# Patient Record
Sex: MALE
[De-identification: names, ages, dates, MRNs, and addresses within clinical notes are randomized per-mention and may not be internally consistent; named-entity substitution may affect disease eponyms.]

---

## 2018-01-19 ENCOUNTER — HOSPITAL ENCOUNTER (OUTPATIENT)
Dept: HOSPITAL 14 - H.ER | Age: 58
Setting detail: OBSERVATION
Discharge: LEFT BEFORE DISCHARGE | End: 2018-01-19
Attending: INTERNAL MEDICINE | Admitting: INTERNAL MEDICINE
Payer: COMMERCIAL

## 2018-01-19 VITALS — RESPIRATION RATE: 20 BRPM | OXYGEN SATURATION: 98 %

## 2018-01-19 VITALS — HEART RATE: 67 BPM | DIASTOLIC BLOOD PRESSURE: 66 MMHG | SYSTOLIC BLOOD PRESSURE: 123 MMHG

## 2018-01-19 VITALS — TEMPERATURE: 98.1 F

## 2018-01-19 DIAGNOSIS — R07.9: Primary | ICD-10-CM

## 2018-01-19 DIAGNOSIS — F17.200: ICD-10-CM

## 2018-01-19 DIAGNOSIS — R42: ICD-10-CM

## 2018-01-19 LAB
ALBUMIN SERPL-MCNC: 3.8 G/DL (ref 3.5–5)
ALBUMIN/GLOB SERPL: 1.3 {RATIO} (ref 1–2.1)
ALT SERPL-CCNC: 40 U/L (ref 21–72)
APTT BLD: 32.9 SECONDS (ref 25.6–37.1)
AST SERPL-CCNC: 24 U/L (ref 17–59)
BASOPHILS # BLD AUTO: 0 K/UL (ref 0–0.2)
BASOPHILS NFR BLD: 0.2 % (ref 0–2)
BNP SERPL-MCNC: 50.7 PG/ML (ref 0–900)
BUN SERPL-MCNC: 18 MG/DL (ref 9–20)
CALCIUM SERPL-MCNC: 9.2 MG/DL (ref 8.4–10.2)
EOSINOPHIL # BLD AUTO: 0.2 K/UL (ref 0–0.7)
EOSINOPHIL NFR BLD: 2.2 % (ref 0–4)
ERYTHROCYTE [DISTWIDTH] IN BLOOD BY AUTOMATED COUNT: 13.4 % (ref 11.5–14.5)
GFR NON-AFRICAN AMERICAN: > 60
HGB BLD-MCNC: 13.7 G/DL (ref 12–18)
INR PPP: 1 (ref 0.9–1.2)
LYMPHOCYTES # BLD AUTO: 2.5 K/UL (ref 1–4.3)
LYMPHOCYTES NFR BLD AUTO: 35.6 % (ref 20–40)
MCH RBC QN AUTO: 30 PG (ref 27–31)
MCHC RBC AUTO-ENTMCNC: 33.1 G/DL (ref 33–37)
MCV RBC AUTO: 90.7 FL (ref 80–94)
MONOCYTES # BLD: 0.6 K/UL (ref 0–0.8)
MONOCYTES NFR BLD: 8.7 % (ref 0–10)
NEUTROPHILS # BLD: 3.7 K/UL (ref 1.8–7)
NEUTROPHILS NFR BLD AUTO: 53.3 % (ref 50–75)
NRBC BLD AUTO-RTO: 0 % (ref 0–0)
PLATELET # BLD: 174 K/UL (ref 130–400)
PMV BLD AUTO: 8.6 FL (ref 7.2–11.7)
PROTHROMBIN TIME: 11.4 SECONDS (ref 9.8–13.1)
RBC # BLD AUTO: 4.57 MIL/UL (ref 4.4–5.9)
WBC # BLD AUTO: 7 K/UL (ref 4.8–10.8)

## 2018-01-19 PROCEDURE — 84484 ASSAY OF TROPONIN QUANT: CPT

## 2018-01-19 PROCEDURE — 99283 EMERGENCY DEPT VISIT LOW MDM: CPT

## 2018-01-19 PROCEDURE — 85610 PROTHROMBIN TIME: CPT

## 2018-01-19 PROCEDURE — 80053 COMPREHEN METABOLIC PANEL: CPT

## 2018-01-19 PROCEDURE — 85025 COMPLETE CBC W/AUTO DIFF WBC: CPT

## 2018-01-19 PROCEDURE — 83880 ASSAY OF NATRIURETIC PEPTIDE: CPT

## 2018-01-19 PROCEDURE — 71045 X-RAY EXAM CHEST 1 VIEW: CPT

## 2018-01-19 PROCEDURE — 85730 THROMBOPLASTIN TIME PARTIAL: CPT

## 2018-01-19 PROCEDURE — 93005 ELECTROCARDIOGRAM TRACING: CPT

## 2018-01-19 NOTE — RAD
PROCEDURE:  CHEST RADIOGRAPH, 1 VIEW



HISTORY:

chest pain



COMPARISON:

None available.



FINDINGS:



LUNGS:

Clear.



PLEURA:

No pneumothorax or pleural fluid seen.



CARDIOVASCULAR:

Atherosclerotic aortic calcifications.  Cardiomediastinal silhouette 

within the limits.



OSSEOUS STRUCTURES:

Degenerative changes.



VISUALIZED UPPER ABDOMEN:

Normal.



OTHER FINDINGS:

None. 



IMPRESSION:

No active disease.

## 2018-01-19 NOTE — ED PDOC
HPI: Chest Pain


Time Seen by Provider: 01/19/18 13:57


Chief Complaint (Nursing): Chest Pain


Chief Complaint (Provider): Chest Pain


History Per: Patient


History/Exam Limitations: no limitations


Onset/Duration Of Symptoms: Mins (prior to arrival)


Current Symptoms Are (Timing): Still Present


Additional Complaint(s): 


57 year old male presents to the ED complaining of chest pain and associated 

dizziness, onset minutes prior to arrival. Questionable history of HTN 

mentioned by patient. He left work after feeling chest pain and dizziness while 

eating lunch. Patient reports intermittent left sided chest discomfort over the 

last several days. The dizziness was a new symptom. He states having a cardiac 

workup done years ago but failed to follow up with the physician. Admits to 

taking Aspirin intermittently. Denies dyspnea, syncope, cough and fever. 





PMD: none provided








Past Medical History


Reviewed: Historical Data, Nursing Documentation, Vital Signs


Vital Signs: 


 Last Vital Signs











Temp  98.1 F   01/19/18 13:48


 


Pulse  67   01/19/18 15:23


 


Resp  20   01/19/18 15:23


 


BP  123/66   01/19/18 15:23


 


Pulse Ox  98   01/19/18 16:59














- Medical History


PMH: HTN (possible)





- Surgical History


Surgical History: No Surg Hx





- Family History


Family History: States: Unknown Family Hx





- Social History


Current smoker - smoking cessation education provided: Yes





- Home Medications


Home Medications: 


 Ambulatory Orders











 Medication  Instructions  Recorded


 


Aspirin [Adult Low Dose Aspirin EC] 81 mg PO DAILY #10 tablet. 01/19/18


 


Aspirin [Ecotrin] 81 mg PO DAILY 01/19/18














- Allergies


Allergies/Adverse Reactions: 


 Allergies











Allergy/AdvReac Type Severity Reaction Status Date / Time


 


No Known Allergies Allergy   Verified 01/19/18 13:48














Review of Systems


ROS Statement: Except As Marked, All Systems Reviewed And Found Negative


Cardiovascular: Positive for: Chest Pain


Neurological: Positive for: Dizziness





Physical Exam





- Reviewed


Nursing Documentation Reviewed: Yes


Vital Signs Reviewed: Yes





- Physical Exam


Appears: Positive for: Non-toxic, No Acute Distress


Head Exam: Positive for: ATRAUMATIC, NORMOCEPHALIC


Skin: Positive for: Normal Color, Warm, Dry


Eye Exam: Positive for: EOMI, Normal appearance, PERRL


Neck: Positive for: Normal, Painless ROM, Supple


Cardiovascular/Chest: Positive for: Regular Rate, Rhythm.  Negative for: Murmur


Respiratory: Positive for: Normal Breath Sounds.  Negative for: Respiratory 

Distress


Gastrointestinal/Abdominal: Positive for: Normal Exam, Soft


Back: Positive for: Normal Inspection.  Negative for: L CVA Tenderness, R CVA 

Tenderness, Vertebral Tenderness


Extremity: Positive for: Normal ROM.  Negative for: Deformity


Neurologic/Psych: Positive for: Alert, Oriented.  Negative for: Motor/Sensory 

Deficits





- Laboratory Results


Result Diagrams: 


 01/19/18 14:07





 01/19/18 14:07





- ECG


ECG: Positive for: Interpreted By Me, Viewed By Me


O2 Sat by Pulse Oximetry: 98





Medical Decision Making


Medical Decision Making: 


Time: 13:55


Plan: 


--EKG 


--BNP 


--CMP 


--Troponin I 


--CBC 


--PTT


--Chest X-ray 


--Aspirin 325 mg PO 


--Nitroglycerin .4 mg SL 





EKG: 


#1 at 13:46: shows ST Elevation anteriorly. No reciprocal changes noted. Rate 

of 60. 


#2 at 13:55: Reveals nonspecific ST changes with incomplete RBB block. Rate is 

also at 60 


Both EKGs were transmitted to Dr. Rizo on call code heart cardiologist at 13:

55. Not a Code heart candidate, but there is concern for Brugada syndrome. 

Patient requires workup. 


 


Chest x-ray was normal and labs show negative troponin. Unremarkable remainder 

of blood results. Aspirin and Nitrolycerin 1 tab were given with resolution of 

symptoms.





Time: 15:50


Patient was updated. Will be admitted to Dr. Rae for cardiac workup 


He remained pain-free in ED.





Time: 1805


Informed by RN patient wants to leave AMA. He spoke to wife who wants him to 

come home and complete workup where he lives. Instructed given EKG findings and 

clinical history with risk factors, he has a HIGH risk of leaving AMA including 

death, heart attack, disability or other unforseen complication. I offered to 

speak to wife (via  service as she does not speak english), patient 

declined.  He is AAOx3 with stable vitals, normal gait, clear speech and intact 

decision making capabilities.  He signed AMA, instructed to go to nearest ER as 

soon as arrives home and DO NOT DRIVE. SOLIS Delgado witnessed and also explained 

findings. 


Dr Rae informed. 





Rx ASA 81mg given x10 days


Smoking cessation discussed, he stated "im done as of today". 





--------------------------------------------------------------------------------

-----------------





Scribe Attestation: 


Documented by Smitha Schaeffer, acting as a scribe for Fritz Palacio III, DO





Provider Scribe Attestation:


All medical record entries made by the Scribe were at my direction and 

personally dictated by me. I have reviewed the chart and agree that the record 

accurately reflects my personal performance of the history, physical exam, 

medical decision making, and the department course for this patient. I have 

also personally directed, reviewed, and agree with the discharge instructions 

and disposition.





Disposition





- Clinical Impression


Clinical Impression: 


 Chest pain, Acute coronary syndrome, Left against medical advice








- Patient ED Disposition


Is Patient to be Admitted: Yes


Counseled Patient/Family Regarding: Studies Performed, Diagnosis, Need For 

Followup, Rx Given





- Disposition


Disposition: Against Medical Advice


Disposition Time: 15:58


Condition: STABLE


Patient Signed Over To: Remi Rae I





- Pt Status Changed To:


Hospital Disposition Of: Observation

## 2018-01-20 NOTE — CARD
--------------- APPROVED REPORT --------------





EKG Measurement

Heart [id number]

DE [id number]

[id number]

[id number]

[id number]



<Conclusion>

Normal sinus rhythm

Incomplete right bundle branch block

Nonspecific ST and T wave abnormality

Abnormal ECG